# Patient Record
Sex: FEMALE | Race: WHITE | NOT HISPANIC OR LATINO | URBAN - METROPOLITAN AREA
[De-identification: names, ages, dates, MRNs, and addresses within clinical notes are randomized per-mention and may not be internally consistent; named-entity substitution may affect disease eponyms.]

---

## 2017-05-29 ENCOUNTER — EMERGENCY (EMERGENCY)
Facility: HOSPITAL | Age: 52
LOS: 1 days | Discharge: LEFT BEFORE DISCHARGE | End: 2017-05-29
Attending: EMERGENCY MEDICINE
Payer: COMMERCIAL

## 2017-05-29 VITALS
OXYGEN SATURATION: 100 % | HEART RATE: 74 BPM | RESPIRATION RATE: 16 BRPM | DIASTOLIC BLOOD PRESSURE: 87 MMHG | TEMPERATURE: 99 F | SYSTOLIC BLOOD PRESSURE: 152 MMHG

## 2017-05-29 VITALS
SYSTOLIC BLOOD PRESSURE: 159 MMHG | HEART RATE: 86 BPM | RESPIRATION RATE: 16 BRPM | OXYGEN SATURATION: 100 % | TEMPERATURE: 98 F | WEIGHT: 198.42 LBS | DIASTOLIC BLOOD PRESSURE: 80 MMHG

## 2017-05-29 DIAGNOSIS — R20.0 ANESTHESIA OF SKIN: ICD-10-CM

## 2017-05-29 DIAGNOSIS — Z53.29 PROCEDURE AND TREATMENT NOT CARRIED OUT BECAUSE OF PATIENT'S DECISION FOR OTHER REASONS: ICD-10-CM

## 2017-05-29 DIAGNOSIS — I10 ESSENTIAL (PRIMARY) HYPERTENSION: ICD-10-CM

## 2017-05-29 DIAGNOSIS — Z90.710 ACQUIRED ABSENCE OF BOTH CERVIX AND UTERUS: ICD-10-CM

## 2017-05-29 DIAGNOSIS — G43.909 MIGRAINE, UNSPECIFIED, NOT INTRACTABLE, WITHOUT STATUS MIGRAINOSUS: ICD-10-CM

## 2017-05-29 DIAGNOSIS — Z90.710 ACQUIRED ABSENCE OF BOTH CERVIX AND UTERUS: Chronic | ICD-10-CM

## 2017-05-29 LAB
ALBUMIN SERPL ELPH-MCNC: 4 G/DL — SIGNIFICANT CHANGE UP (ref 3.5–5)
ALP SERPL-CCNC: 88 U/L — SIGNIFICANT CHANGE UP (ref 40–120)
ALT FLD-CCNC: 30 U/L DA — SIGNIFICANT CHANGE UP (ref 10–60)
ANION GAP SERPL CALC-SCNC: 3 MMOL/L — LOW (ref 5–17)
AST SERPL-CCNC: 24 U/L — SIGNIFICANT CHANGE UP (ref 10–40)
BASOPHILS # BLD AUTO: 0.1 K/UL — SIGNIFICANT CHANGE UP (ref 0–0.2)
BASOPHILS NFR BLD AUTO: 1.4 % — SIGNIFICANT CHANGE UP (ref 0–2)
BILIRUB SERPL-MCNC: 0.4 MG/DL — SIGNIFICANT CHANGE UP (ref 0.2–1.2)
BUN SERPL-MCNC: 13 MG/DL — SIGNIFICANT CHANGE UP (ref 7–18)
CALCIUM SERPL-MCNC: 8.6 MG/DL — SIGNIFICANT CHANGE UP (ref 8.4–10.5)
CHLORIDE SERPL-SCNC: 105 MMOL/L — SIGNIFICANT CHANGE UP (ref 96–108)
CO2 SERPL-SCNC: 30 MMOL/L — SIGNIFICANT CHANGE UP (ref 22–31)
CREAT SERPL-MCNC: 0.89 MG/DL — SIGNIFICANT CHANGE UP (ref 0.5–1.3)
EOSINOPHIL # BLD AUTO: 0.1 K/UL — SIGNIFICANT CHANGE UP (ref 0–0.5)
EOSINOPHIL NFR BLD AUTO: 1.3 % — SIGNIFICANT CHANGE UP (ref 0–6)
GLUCOSE SERPL-MCNC: 93 MG/DL — SIGNIFICANT CHANGE UP (ref 70–99)
HCT VFR BLD CALC: 39.7 % — SIGNIFICANT CHANGE UP (ref 34.5–45)
HGB BLD-MCNC: 12.6 G/DL — SIGNIFICANT CHANGE UP (ref 11.5–15.5)
LYMPHOCYTES # BLD AUTO: 3.2 K/UL — SIGNIFICANT CHANGE UP (ref 1–3.3)
LYMPHOCYTES # BLD AUTO: 53.4 % — HIGH (ref 13–44)
MAGNESIUM SERPL-MCNC: 1.8 MG/DL — SIGNIFICANT CHANGE UP (ref 1.6–2.6)
MCHC RBC-ENTMCNC: 30.4 PG — SIGNIFICANT CHANGE UP (ref 27–34)
MCHC RBC-ENTMCNC: 31.6 GM/DL — LOW (ref 32–36)
MCV RBC AUTO: 96.3 FL — SIGNIFICANT CHANGE UP (ref 80–100)
MONOCYTES # BLD AUTO: 0.6 K/UL — SIGNIFICANT CHANGE UP (ref 0–0.9)
MONOCYTES NFR BLD AUTO: 9.3 % — SIGNIFICANT CHANGE UP (ref 2–14)
NEUTROPHILS # BLD AUTO: 2.1 K/UL — SIGNIFICANT CHANGE UP (ref 1.8–7.4)
NEUTROPHILS NFR BLD AUTO: 34.6 % — LOW (ref 43–77)
PLATELET # BLD AUTO: 236 K/UL — SIGNIFICANT CHANGE UP (ref 150–400)
POTASSIUM SERPL-MCNC: 4.3 MMOL/L — SIGNIFICANT CHANGE UP (ref 3.5–5.3)
POTASSIUM SERPL-SCNC: 4.3 MMOL/L — SIGNIFICANT CHANGE UP (ref 3.5–5.3)
PROT SERPL-MCNC: 7.8 G/DL — SIGNIFICANT CHANGE UP (ref 6–8.3)
RBC # BLD: 4.13 M/UL — SIGNIFICANT CHANGE UP (ref 3.8–5.2)
RBC # FLD: 13.8 % — SIGNIFICANT CHANGE UP (ref 10.3–14.5)
SODIUM SERPL-SCNC: 138 MMOL/L — SIGNIFICANT CHANGE UP (ref 135–145)
TROPONIN I SERPL-MCNC: <0.015 NG/ML — SIGNIFICANT CHANGE UP (ref 0–0.04)
WBC # BLD: 6 K/UL — SIGNIFICANT CHANGE UP (ref 3.8–10.5)
WBC # FLD AUTO: 6 K/UL — SIGNIFICANT CHANGE UP (ref 3.8–10.5)

## 2017-05-29 PROCEDURE — 70450 CT HEAD/BRAIN W/O DYE: CPT | Mod: 26

## 2017-05-29 PROCEDURE — 83735 ASSAY OF MAGNESIUM: CPT

## 2017-05-29 PROCEDURE — 70450 CT HEAD/BRAIN W/O DYE: CPT

## 2017-05-29 PROCEDURE — 99284 EMERGENCY DEPT VISIT MOD MDM: CPT | Mod: 25

## 2017-05-29 PROCEDURE — 99285 EMERGENCY DEPT VISIT HI MDM: CPT

## 2017-05-29 PROCEDURE — 85027 COMPLETE CBC AUTOMATED: CPT

## 2017-05-29 PROCEDURE — 84484 ASSAY OF TROPONIN QUANT: CPT

## 2017-05-29 PROCEDURE — 80053 COMPREHEN METABOLIC PANEL: CPT

## 2017-05-29 RX ADMIN — Medication 0.5 MILLIGRAM(S): at 22:14

## 2017-05-29 NOTE — ED PROVIDER NOTE - OBJECTIVE STATEMENT
51 y/o F pt with PMHx of HTN, presents to ED c/o numbness to R hand with lightheadedness, nausea and HA x 1 hour PTA (21:00). Pt reports she went to sleep around noon today and woke up 1 hour PTA with the numbness sensation to her R hand. Pt states falling asleep on her bed but denies sleeping in an awkward position that may have caused her hand to feel numb. Pt denies fever, chills, vomiting, diarrhea, neck pain, SOB, or any other complaints. NKDA.

## 2017-05-29 NOTE — ED PROVIDER NOTE - NS ED MD SCRIBE ATTENDING SCRIBE SECTIONS
PAST MEDICAL/SURGICAL/SOCIAL HISTORY/DISPOSITION/HISTORY OF PRESENT ILLNESS/VITAL SIGNS( Pullset)/PHYSICAL EXAM/REVIEW OF SYSTEMS/HIV

## 2017-05-29 NOTE — ED PROVIDER NOTE - MEDICAL DECISION MAKING DETAILS
51 y/o M pt with subjective diminished sensation to the 4th and 5th digits of the R hand. Will do labs, CT head, and reassess. 53 y/o M pt with subjective diminished sensation to the 4th and 5th digits of the R hand. Consideration for stroke, nerve compression vs anxiety. Will do labs, CT head, and reassess.

## 2017-05-29 NOTE — ED PROVIDER NOTE - PROGRESS NOTE DETAILS
D/w pt, still feel numbnes to hand, relates feeling better with nausea "I want to go home can I be discharged?" Discussed CT, labs, testing incomplete for stroke, pt understands limitations of w/u for stroke, wants to go home. Refused EKG to assess for abnormal heart rhythm. Understands r/o worsening/repeat stroke causing disability/death as a result. Pt signed out AMA.

## 2021-12-21 ENCOUNTER — EMERGENCY (EMERGENCY)
Facility: HOSPITAL | Age: 56
LOS: 1 days | Discharge: HOME | End: 2021-12-21
Attending: EMERGENCY MEDICINE
Payer: MEDICAID

## 2021-12-21 VITALS
HEIGHT: 72 IN | SYSTOLIC BLOOD PRESSURE: 127 MMHG | TEMPERATURE: 98 F | RESPIRATION RATE: 17 BRPM | WEIGHT: 175.05 LBS | OXYGEN SATURATION: 98 % | DIASTOLIC BLOOD PRESSURE: 81 MMHG | HEART RATE: 84 BPM

## 2021-12-21 DIAGNOSIS — Z90.710 ACQUIRED ABSENCE OF BOTH CERVIX AND UTERUS: Chronic | ICD-10-CM

## 2021-12-21 PROCEDURE — 99284 EMERGENCY DEPT VISIT MOD MDM: CPT

## 2021-12-21 PROCEDURE — 99283 EMERGENCY DEPT VISIT LOW MDM: CPT | Mod: 25

## 2021-12-21 PROCEDURE — 71101 X-RAY EXAM UNILAT RIBS/CHEST: CPT

## 2021-12-21 PROCEDURE — 71101 X-RAY EXAM UNILAT RIBS/CHEST: CPT | Mod: 26

## 2021-12-21 RX ORDER — OXYCODONE AND ACETAMINOPHEN 5; 325 MG/1; MG/1
1 TABLET ORAL ONCE
Refills: 0 | Status: DISCONTINUED | OUTPATIENT
Start: 2021-12-21 | End: 2021-12-21

## 2021-12-21 RX ADMIN — OXYCODONE AND ACETAMINOPHEN 1 TABLET(S): 5; 325 TABLET ORAL at 18:59

## 2021-12-21 NOTE — ED PROVIDER NOTE - OBJECTIVE STATEMENT
56 y.o female with a PMHx of migraines and hypertension and a surgical history of a hysterectomy is here s/p a fall injuring the left side of her chest yesterday. Patient states she has pain and denies loss of consciousness and trouble breathing.

## 2021-12-21 NOTE — ED ADULT NURSE NOTE - NSIMPLEMENTINTERV_GEN_ALL_ED
Implemented All Universal Safety Interventions:  [place] to call system. Call bell, personal items and telephone within reach. Instruct patient to call for assistance. Room bathroom lighting operational. Non-slip footwear when patient is off stretcher. Physically safe environment: no spills, clutter or unnecessary equipment. Stretcher in lowest position, wheels locked, appropriate side rails in place.

## 2021-12-21 NOTE — ED PROVIDER NOTE - NSFOLLOWUPINSTRUCTIONS_ED_ALL_ED_FT
[facility]                                                                      Rib Contusion      A rib contusion is a deep bruise on the rib area. Contusions are the result of a blunt trauma that causes bleeding and injury to the tissues under the skin. A rib contusion may involve bruising of the ribs and of the skin and muscles in the area. The skin over the contusion may turn blue, purple, or yellow. Minor injuries result in a painless contusion. More severe contusions may be painful and swollen for a few weeks.      What are the causes?    This condition is usually caused by a hard, direct hit to an area of the body. This often occurs while playing contact sports.      What are the signs or symptoms?  Symptoms of this condition include:  •Swelling and redness of the injured area.      •Discoloration of the injured area.      •Tenderness and soreness of the injured area.      •Pain with or without movement.      •Pain when breathing in.        How is this diagnosed?  This condition may be diagnosed based on:  •Your symptoms and medical history.      •A physical exam.      •Imaging tests—such as an X-ray, CT scan, or MRI—to determine if there were internal injuries or broken bones (fractures).        How is this treated?  This condition may be treated with:  •Rest. This is often the best treatment for a rib contusion.      •Ice packs. This reduces swelling and inflammation.      •Deep-breathing exercises. These may be recommended to reduce the risk for lung collapse and pneumonia.      •Medicines. Over-the-counter or prescription medicines may be given to control pain.      •Injection of a numbing medicine around the nerve near your injury (nerve block).        Follow these instructions at home:    Medicines     •Take over-the-counter and prescription medicines only as told by your health care provider.    •Ask your health care provider if the medicine prescribed to you:  •Requires you to avoid driving or using machinery.    •Can cause constipation. You may need to take these actions to prevent or treat constipation:  •Drink enough fluid to keep your urine pale yellow.       •Take over-the-counter or prescription medicines.      •Eat foods that are high in fiber, such as beans, whole grains, and fresh fruits and vegetables.      •Limit foods that are high in fat and processed sugars, such as fried or sweet foods.             Managing pain, stiffness, and swelling   If directed, put ice on the injured area. To do this:  •Put ice in a plastic bag.      •Place a towel between your skin and the bag.      •Leave the ice on for 20 minutes, 2–3 times a day.      •Remove the ice if your skin turns bright red. This is very important. If you cannot feel pain, heat, or cold, you have a greater risk of damage to the area.      Activity     •Rest the injured area.      •Avoid strenuous activity and any activities or movements that cause pain. Be careful during activities, and avoid bumping the injured area.      • Do not lift anything that is heavier than 5 lb (2.3 kg), or the limit that you are told, until your health care provider says that it is safe.        General instructions      • Do not use any products that contain nicotine or tobacco, such as cigarettes, e-cigarettes, and chewing tobacco. These can delay healing. If you need help quitting, ask your health care provider.      •Do deep-breathing exercises as told by your health care provider.      •If you were given an incentive spirometer, use it every 1–2 hours while you are awake, or as recommended by your health care provider. This device measures how well you are filling your lungs with each breath.      •Keep all follow-up visits. This is important.        Contact a health care provider if you have:    •Increased bruising or swelling.      •Pain that is not controlled with treatment.      •A fever.        Get help right away if you:    •Have difficulty breathing or shortness of breath.      •Develop a continual cough, or you cough up thick or bloody mucus from your lungs (sputum).      •Feel nauseous or you vomit.      •Have pain in your abdomen.      These symptoms may represent a serious problem that is an emergency. Do not wait to see if the symptoms will go away. Get medical help right away. Call your local emergency services (911 in the [place] Do not drive yourself to the hospital.       Summary    •A rib contusion is a deep bruise on your rib area. Contusions are the result of a blunt trauma that causes bleeding and injury to the tissues under the skin.      •The skin over the contusion may turn blue, purple, or yellow. Minor injuries may cause a painless contusion. More severe contusions may be painful and swollen for a few weeks.      •Rest the injured area. Avoid strenuous activity and any activities or movements that cause pain.      This information is not intended to replace advice given to you by your health care provider. Make sure you discuss any questions you have with your health care provider.      Document Revised: 03/24/2021 Document Reviewed: 03/24/2021    Elsevier Patient Education © 2021 [employer]

## 2021-12-21 NOTE — ED PROVIDER NOTE - PATIENT PORTAL LINK FT
You can access the FollowMyHealth Patient Portal offered by [facility] by registering at the following website: [email or web address]. By joining [employer]’s FollowMyHealth portal, you will also be able to view your health information using other applications (apps) compatible with our system.

## 2021-12-21 NOTE — ED ADULT NURSE NOTE - NS ED NURSE DISCH DISPOSITION
Nephrology Daily Progress Note    Date of Service  9/7/2020    Chief Complaint  41 y.o. 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    Interval Problem Update  9/2 -patient remains in ICU on low-dose norepinephrine.  Patient complains of some hemoptysis.  Patient denies chest pain, shortness of breath.  9/3-patient had dialysis yesterday with 2 L removed.  No issues with dialysis yesterday.  Patient weaned off pressors overnight.  Patient seen this afternoon, denies chest pain, shortness of breath.  9/5 -patient had dialysis yesterday with 2 L removed.  Oxygenation improving today.  Patient denies chest pain, shortness of breath.  9/6 -patient remains on supplemental oxygen, with 4 L/min.  Patient denies chest pain, shortness of breath.  Hoping to go home soon.  9/7 pt is doing better, anxious to go home, had HD earlier today  Review of Systems  Review of Systems   Constitutional: Negative for chills, fever and malaise/fatigue.   Respiratory: Negative for cough and shortness of breath.    Cardiovascular: Negative for chest pain and leg swelling.   Gastrointestinal: Negative for nausea and vomiting.   Genitourinary: Negative for dysuria, frequency and urgency.        Physical Exam  Temp:  [36.3 °C (97.3 °F)-36.4 °C (97.5 °F)] 36.4 °C (97.5 °F)  Pulse:  [85-93] 85  Resp:  [18] 18  BP: (103-118)/(55-73) 103/55  SpO2:  [97 %-100 %] 97 %    Physical Exam  Vitals signs and nursing note reviewed.   Constitutional:       General: He is not in acute distress.     Appearance: He is not ill-appearing.   HENT:      Head: Normocephalic and atraumatic.      Right Ear: External ear normal.      Left Ear: External ear normal.      Nose: Nose normal.   Eyes:      General:         Right eye: No discharge.         Left eye: No discharge.      Conjunctiva/sclera: Conjunctivae normal.   Cardiovascular:      Rate and  Rhythm: Normal rate and regular rhythm.      Heart sounds: No murmur.   Pulmonary:      Effort: Pulmonary effort is normal. No respiratory distress.      Breath sounds: Normal breath sounds. No wheezing.   Musculoskeletal:         General: No tenderness or deformity.      Right lower leg: Edema present.      Left lower leg: Edema present.   Skin:     General: Skin is warm.   Neurological:      General: No focal deficit present.      Mental Status: He is alert and oriented to person, place, and time.   Psychiatric:         Mood and Affect: Mood normal.         Behavior: Behavior normal.     Access: Left thigh AV graft, with patent bruit    Fluids    Intake/Output Summary (Last 24 hours) at 9/7/2020 1159  Last data filed at 9/7/2020 0900  Gross per 24 hour   Intake 180 ml   Output 2500 ml   Net -2320 ml       Laboratory  Labs reviewed, pertinent labs below.  Recent Labs     09/05/20  0143 09/05/20  1235 09/06/20  0541 09/07/20  0521   WBC 6.9  --  5.4 7.2   RBC 2.63*  --  2.64* 2.54*   HEMOGLOBIN 8.1*  8.1* 8.4* 8.0* 7.8*   HEMATOCRIT 25.0*  25.0* 26.5* 25.5* 23.7*   MCV 97.3  --  96.6 93.3   MCH 30.4  --  30.3 30.7   MCHC 31.3*  --  31.4* 32.9*   RDW 60.2*  --  59.1* 56.0*   PLATELETCT 222  --  219 222   MPV 9.8  --  9.5 9.0     Recent Labs     09/05/20  0143 09/06/20  0541 09/07/20  0521   SODIUM 137 138 134*   POTASSIUM 4.7 4.4 4.4   CHLORIDE 91* 93* 89*   CO2 26 24 24   GLUCOSE 119* 125* 109*   BUN 38* 61* 78*   CREATININE 5.63* 7.80* 8.95*   CALCIUM 8.0* 7.7* 7.6*     Recent Labs     09/05/20  0143 09/06/20  0111 09/07/20  0521   INR 4.04* 3.90* 5.84*           URINALYSIS:  No results found for: COLORURINE, CLARITY, SPECGRAVITY, PHURINE, KETONES, PROTEINURIN, BILIRUBINUR, UROBILU, NITRITE, LEUKESTERAS, OCCULTBLOOD  UPC  No results found for: TOTPROTUR No results found for: CREATININEU      Imaging reviewed  DX-CHEST-PORTABLE (1 VIEW)   Final Result      Bilateral interstitial and airspace opacities are most  suggestive of atypical infection. Edema is considered less likely.            Current Facility-Administered Medications   Medication Dose Route Frequency Provider Last Rate Last Dose   • hydrALAZINE (APRESOLINE) injection 10 mg  10 mg Intravenous Q6HRS PRN Jonn Gallo M.D.       • amLODIPine (NORVASC) tablet 5 mg  5 mg Oral Q DAY Jonn Gallo M.D.   Stopped at 09/07/20 0600   • MD Alert...Warfarin per Pharmacy   Other PHARMACY TO DOSE Jonn Gallo M.D.       • albuterol inhaler 2 Puff  2 Puff Inhalation Q4HRS Jonn Gallo M.D.   2 Puff at 09/07/20 0849   • budesonide-formoterol (SYMBICORT) 80-4.5 MCG/ACT inhaler 2 Puff  2 Puff Inhalation BID Jonn Gallo M.D.   2 Puff at 09/07/20 0612   • ascorbic acid tablet 2,000 mg  2,000 mg Oral DAILY Jonn Gallo M.D.   2,000 mg at 09/07/20 0611   • vitamin D (cholecalciferol) tablet 1,000 Units  1,000 Units Oral DAILY Jonn Gallo M.D.   1,000 Units at 09/07/20 0611   • zinc sulfate (ZINCATE) capsule 220 mg  220 mg Oral DAILY Jonn Gallo M.D.   220 mg at 09/07/20 0611   • guaiFENesin ER (MUCINEX) tablet 600 mg  600 mg Oral Q12HRS Jonn Gallo M.D.   600 mg at 09/07/20 0611   • ALPRAZolam (XANAX) tablet 0.25 mg  0.25 mg Oral HS PRN Polly William M.D.   0.25 mg at 09/07/20 0011   • HYDROcodone-acetaminophen (NORCO) 5-325 MG per tablet 1-2 Tab  1-2 Tab Oral Q6HRS PRN Bolivar Paz M.D.   2 Tab at 09/07/20 0611   • NS (BOLUS) infusion 250 mL  250 mL Intravenous DIALYSIS PRN Raul Londono M.D.       • albumin human 25% solution 12.5 g  12.5 g Intravenous DIALYSIS PRN Raul Londono M.D.       • lidocaine (XYLOCAINE) 1 % injection 1 mL  1 mL Intradermal PRN Raul Londono M.D.   1 mL at 09/04/20 1245   • epoetin (Retacrit) injection (Dialysis Use Only) 4,000 Units  4,000 Units Intravenous MO, WE + FR Raul Londono M.D.   4,000 Units at 09/07/20 0700   • benzocaine-menthol (CEPACOL) lozenge 1 Lozenge  1 Lozenge Mouth/Throat Q2HRS PRN Bolivar Paz M.D.   1  Lozenge at 09/03/20 0029   • benzonatate (TESSALON) capsule 100 mg  100 mg Oral TID PRN Polly William M.D.   100 mg at 09/06/20 2121   • guaiFENesin (ROBITUSSIN) 100 MG/5ML solution 200 mg  10 mL Oral Q4HRS PRN Polly William M.D.   200 mg at 09/06/20 2119   • HYDROcodone/acetaminophen (NORCO)  MG per tablet 3 Tab  3 Tab Oral QHS Bolivar Paz M.D.   3 Tab at 09/06/20 2120   • sevelamer carbonate (RENVELA) tablet 3,200 mg  3,200 mg Oral TID WITH MEALS Bolivar Paz M.D.   3,200 mg at 09/06/20 1823   • tizanidine (ZANAFLEX) tablet 8 mg  8 mg Oral QHS Bolivar Paz M.D.   8 mg at 09/06/20 2121   • calcitRIOL (ROCALTROL) capsule 0.75 mcg  0.75 mcg Oral QHS Bolivar Paz M.D.   0.75 mcg at 09/06/20 2121   • cephALEXin (KEFLEX) capsule 500 mg  500 mg Oral MO, WE + FR Bolivar Paz M.D.   500 mg at 09/07/20 0849   • gabapentin (NEURONTIN) capsule 2,400 mg  2,400 mg Oral Nightly Bolivar Paz M.D.   2,400 mg at 09/06/20 2121    And   • gabapentin (NEURONTIN) capsule 600 mg  600 mg Oral QHS Bolivar Paz M.D.   600 mg at 09/06/20 2120   • dexamethasone (DECADRON) tablet 6 mg  6 mg Oral DAILY Bolivar Paz M.D.   6 mg at 09/07/20 0611         Assessment/Plan  41 y.o. 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     1. ESRD on HD   2. Access: left thigh AVG  3. COVID-19 pneumonia.:better  4.  Hypertension: controlled.  .  5. Anemia of chronic disease.  HD TTS as outpt  Renal diet  Daily labs  Renal dose all meds  Avoid nephrotoxins  Prognosis guarded.  D/W Dr Gallo         Discharged

## 2022-06-01 ENCOUNTER — EMERGENCY (EMERGENCY)
Facility: HOSPITAL | Age: 57
LOS: 1 days | Discharge: LEFT BEFORE DISCHARGE | End: 2022-06-01
Attending: STUDENT IN AN ORGANIZED HEALTH CARE EDUCATION/TRAINING PROGRAM
Payer: MEDICAID

## 2022-06-01 VITALS
TEMPERATURE: 98 F | HEIGHT: 70.08 IN | WEIGHT: 198.42 LBS | OXYGEN SATURATION: 100 % | HEART RATE: 80 BPM | RESPIRATION RATE: 18 BRPM | DIASTOLIC BLOOD PRESSURE: 79 MMHG | SYSTOLIC BLOOD PRESSURE: 122 MMHG

## 2022-06-01 DIAGNOSIS — Z90.710 ACQUIRED ABSENCE OF BOTH CERVIX AND UTERUS: Chronic | ICD-10-CM

## 2022-06-01 PROCEDURE — 99283 EMERGENCY DEPT VISIT LOW MDM: CPT

## 2022-06-01 PROCEDURE — L9991: CPT

## 2022-06-01 PROCEDURE — 93005 ELECTROCARDIOGRAM TRACING: CPT

## 2022-06-01 RX ORDER — ONDANSETRON 8 MG/1
4 TABLET, FILM COATED ORAL ONCE
Refills: 0 | Status: DISCONTINUED | OUTPATIENT
Start: 2022-06-01 | End: 2022-06-05

## 2022-06-01 RX ORDER — ACETAMINOPHEN 500 MG
650 TABLET ORAL ONCE
Refills: 0 | Status: DISCONTINUED | OUTPATIENT
Start: 2022-06-01 | End: 2022-06-05

## 2022-06-01 RX ORDER — SODIUM CHLORIDE 9 MG/ML
1000 INJECTION INTRAMUSCULAR; INTRAVENOUS; SUBCUTANEOUS ONCE
Refills: 0 | Status: DISCONTINUED | OUTPATIENT
Start: 2022-06-01 | End: 2022-06-05

## 2022-06-02 NOTE — ED ADULT NURSE NOTE - CHIEF COMPLAINT QUOTE
pt complaints of vomiting x 2 days. Also c/o belly pain and pain under the left breast.  States " I have a breast implant since 2013". Pt also states that she got Shingles vaccine 2-3 weeks ago.

## 2022-06-02 NOTE — ED ADULT NURSE NOTE - NSIMPLEMENTINTERV_GEN_ALL_ED
Implemented All Fall with Harm Risk Interventions:  [place] to call system. Call bell, personal items and telephone within reach. Instruct patient to call for assistance. Room bathroom lighting operational. Non-slip footwear when patient is off stretcher. Physically safe environment: no spills, clutter or unnecessary equipment. Stretcher in lowest position, wheels locked, appropriate side rails in place. Provide visual cue, wrist band, yellow gown, etc. Monitor gait and stability. Monitor for mental status changes and reorient to person, place, and time. Review medications for side effects contributing to fall risk. Reinforce activity limits and safety measures with patient and family. Provide visual clues: red socks.

## 2022-10-27 NOTE — ED ADULT NURSE NOTE - NSFALLRSKASSESASSIST_ED_ALL_ED
Multiple attempts to reach pt unsuccessful.  Will send letter.   RNCM very familiar with pt.  Needs: a1c, dm/ckd microalbumin.  Pt needs new pcp, will assist pt in finding one.  Luis A   no

## 2023-03-05 ENCOUNTER — EMERGENCY (EMERGENCY)
Facility: HOSPITAL | Age: 58
LOS: 1 days | Discharge: HOME | End: 2023-03-05
Attending: STUDENT IN AN ORGANIZED HEALTH CARE EDUCATION/TRAINING PROGRAM
Payer: MEDICAID

## 2023-03-05 VITALS
WEIGHT: 190.04 LBS | DIASTOLIC BLOOD PRESSURE: 81 MMHG | OXYGEN SATURATION: 98 % | RESPIRATION RATE: 18 BRPM | HEART RATE: 78 BPM | SYSTOLIC BLOOD PRESSURE: 136 MMHG | TEMPERATURE: 98 F

## 2023-03-05 VITALS
OXYGEN SATURATION: 97 % | SYSTOLIC BLOOD PRESSURE: 134 MMHG | DIASTOLIC BLOOD PRESSURE: 80 MMHG | RESPIRATION RATE: 18 BRPM | HEART RATE: 82 BPM

## 2023-03-05 DIAGNOSIS — Z90.710 ACQUIRED ABSENCE OF BOTH CERVIX AND UTERUS: Chronic | ICD-10-CM

## 2023-03-05 LAB
ALBUMIN SERPL ELPH-MCNC: 3.8 G/DL — SIGNIFICANT CHANGE UP (ref 3.5–5)
ALP SERPL-CCNC: 102 U/L — SIGNIFICANT CHANGE UP (ref 40–120)
ALT FLD-CCNC: 41 U/L DA — SIGNIFICANT CHANGE UP (ref 10–60)
ANION GAP SERPL CALC-SCNC: 4 MMOL/L — LOW (ref 5–17)
AST SERPL-CCNC: 27 U/L — SIGNIFICANT CHANGE UP (ref 10–40)
BASOPHILS # BLD AUTO: 0.06 K/UL — SIGNIFICANT CHANGE UP (ref 0–0.2)
BASOPHILS NFR BLD AUTO: 0.9 % — SIGNIFICANT CHANGE UP (ref 0–2)
BILIRUB SERPL-MCNC: 0.5 MG/DL — SIGNIFICANT CHANGE UP (ref 0.2–1.2)
BUN SERPL-MCNC: 18 MG/DL — SIGNIFICANT CHANGE UP (ref 7–18)
CALCIUM SERPL-MCNC: 9 MG/DL — SIGNIFICANT CHANGE UP (ref 8.4–10.5)
CHLORIDE SERPL-SCNC: 109 MMOL/L — HIGH (ref 96–108)
CO2 SERPL-SCNC: 26 MMOL/L — SIGNIFICANT CHANGE UP (ref 22–31)
CREAT SERPL-MCNC: 0.98 MG/DL — SIGNIFICANT CHANGE UP (ref 0.5–1.3)
D DIMER BLD IA.RAPID-MCNC: 192 NG/ML DDU — SIGNIFICANT CHANGE UP
EGFR: 68 ML/MIN/1.73M2 — SIGNIFICANT CHANGE UP
EOSINOPHIL # BLD AUTO: 0.04 K/UL — SIGNIFICANT CHANGE UP (ref 0–0.5)
EOSINOPHIL NFR BLD AUTO: 0.6 % — SIGNIFICANT CHANGE UP (ref 0–6)
FLUAV AG NPH QL: SIGNIFICANT CHANGE UP
FLUBV AG NPH QL: SIGNIFICANT CHANGE UP
GLUCOSE SERPL-MCNC: 97 MG/DL — SIGNIFICANT CHANGE UP (ref 70–99)
HCT VFR BLD CALC: 36 % — SIGNIFICANT CHANGE UP (ref 34.5–45)
HGB BLD-MCNC: 11.6 G/DL — SIGNIFICANT CHANGE UP (ref 11.5–15.5)
IMM GRANULOCYTES NFR BLD AUTO: 0.1 % — SIGNIFICANT CHANGE UP (ref 0–0.9)
LACTATE SERPL-SCNC: 1.7 MMOL/L — SIGNIFICANT CHANGE UP (ref 0.7–2)
LYMPHOCYTES # BLD AUTO: 3 K/UL — SIGNIFICANT CHANGE UP (ref 1–3.3)
LYMPHOCYTES # BLD AUTO: 42.7 % — SIGNIFICANT CHANGE UP (ref 13–44)
MCHC RBC-ENTMCNC: 29.5 PG — SIGNIFICANT CHANGE UP (ref 27–34)
MCHC RBC-ENTMCNC: 32.2 GM/DL — SIGNIFICANT CHANGE UP (ref 32–36)
MCV RBC AUTO: 91.6 FL — SIGNIFICANT CHANGE UP (ref 80–100)
MONOCYTES # BLD AUTO: 0.69 K/UL — SIGNIFICANT CHANGE UP (ref 0–0.9)
MONOCYTES NFR BLD AUTO: 9.8 % — SIGNIFICANT CHANGE UP (ref 2–14)
NEUTROPHILS # BLD AUTO: 3.23 K/UL — SIGNIFICANT CHANGE UP (ref 1.8–7.4)
NEUTROPHILS NFR BLD AUTO: 45.9 % — SIGNIFICANT CHANGE UP (ref 43–77)
NRBC # BLD: 0 /100 WBCS — SIGNIFICANT CHANGE UP (ref 0–0)
NT-PROBNP SERPL-SCNC: 70 PG/ML — SIGNIFICANT CHANGE UP (ref 0–125)
PLATELET # BLD AUTO: 257 K/UL — SIGNIFICANT CHANGE UP (ref 150–400)
POTASSIUM SERPL-MCNC: 3.6 MMOL/L — SIGNIFICANT CHANGE UP (ref 3.5–5.3)
POTASSIUM SERPL-SCNC: 3.6 MMOL/L — SIGNIFICANT CHANGE UP (ref 3.5–5.3)
PROT SERPL-MCNC: 6.9 G/DL — SIGNIFICANT CHANGE UP (ref 6–8.3)
RBC # BLD: 3.93 M/UL — SIGNIFICANT CHANGE UP (ref 3.8–5.2)
RBC # FLD: 14.4 % — SIGNIFICANT CHANGE UP (ref 10.3–14.5)
SARS-COV-2 RNA SPEC QL NAA+PROBE: DETECTED
SODIUM SERPL-SCNC: 139 MMOL/L — SIGNIFICANT CHANGE UP (ref 135–145)
TROPONIN I, HIGH SENSITIVITY RESULT: 4.7 NG/L — SIGNIFICANT CHANGE UP
WBC # BLD: 7.03 K/UL — SIGNIFICANT CHANGE UP (ref 3.8–10.5)
WBC # FLD AUTO: 7.03 K/UL — SIGNIFICANT CHANGE UP (ref 3.8–10.5)

## 2023-03-05 PROCEDURE — 80053 COMPREHEN METABOLIC PANEL: CPT

## 2023-03-05 PROCEDURE — 99283 EMERGENCY DEPT VISIT LOW MDM: CPT

## 2023-03-05 PROCEDURE — 99285 EMERGENCY DEPT VISIT HI MDM: CPT

## 2023-03-05 PROCEDURE — 84484 ASSAY OF TROPONIN QUANT: CPT

## 2023-03-05 PROCEDURE — 87637 SARSCOV2&INF A&B&RSV AMP PRB: CPT

## 2023-03-05 PROCEDURE — 83880 ASSAY OF NATRIURETIC PEPTIDE: CPT

## 2023-03-05 PROCEDURE — 85025 COMPLETE CBC W/AUTO DIFF WBC: CPT

## 2023-03-05 PROCEDURE — 83605 ASSAY OF LACTIC ACID: CPT

## 2023-03-05 PROCEDURE — 71045 X-RAY EXAM CHEST 1 VIEW: CPT | Mod: 26

## 2023-03-05 PROCEDURE — 85379 FIBRIN DEGRADATION QUANT: CPT

## 2023-03-05 PROCEDURE — 71045 X-RAY EXAM CHEST 1 VIEW: CPT

## 2023-03-05 PROCEDURE — 36415 COLL VENOUS BLD VENIPUNCTURE: CPT

## 2023-03-05 RX ORDER — NIRMATRELVIR AND RITONAVIR 150-100 MG
1 KIT ORAL
Qty: 10 | Refills: 0
Start: 2023-03-05 | End: 2023-03-09

## 2023-03-05 NOTE — ED PROVIDER NOTE - OBJECTIVE STATEMENT
57-year-old female hx of HTN presenting with bodyaches and dizziness for the past day. No chest pain or SOB. No other symptoms. Tested positive on a home COVID test earlier today, so she came to ER to get checked out.

## 2023-03-05 NOTE — ED PROVIDER NOTE - PATIENT PORTAL LINK FT
You can access the FollowMyHealth Patient Portal offered by [facility] by registering at the following website: [email or web address]. By joining [employer]’s FollowMyHealth portal, you will also be able to view your health information using other applications (apps) compatible with our system.

## 2023-03-05 NOTE — ED ADULT NURSE NOTE - OBJECTIVE STATEMENT
Pt states did not feel well today , did a home test and was positive for Covid   c/o dizziness, body aches , fever and chills

## 2023-03-05 NOTE — ED PROVIDER NOTE - DISPOSITION TYPE
Refill requested. Chart reviewed. Follow up and lab appointments on file for end of June 2019. Refilled as requested.     E-scribe failed twice on Levothyroxine. Left message for pharmacy.     levothyroxine (SYNTHROID, LEVOTHROID) 150 MCG tablet 90 tablet 0 6/4/2019     Sig - Route: Take 1 tablet by mouth daily. - Oral    Sent to pharmacy as: Levothyroxine Sodium 150 MCG Oral Tablet    Class: Eprescribe    E-Prescribing Status: Transmission to pharmacy failed (6/4/2019  1:57 PM CDT)           DISCHARGE

## 2023-03-05 NOTE — ED PROVIDER NOTE - CLINICAL SUMMARY MEDICAL DECISION MAKING FREE TEXT BOX
57-year-old female hx of HTN presenting with bodyaches and dizziness for the past day. Tested positive for COVID. Labs and CXR normal. Symptoms likely due to COVID. Patient is requesting Paxlovid. Supportive care recs and return precautions provided.

## 2023-03-05 NOTE — ED PROVIDER NOTE - NSFOLLOWUPINSTRUCTIONS_ED_ALL_ED_FT
You were seen in the emergency department for: bodyaches  Your diagnosis for this visit was: COVID-19  Please make sure to stay hydrated and take Tylenol/Ibuprofen for fevers/pain.  If you develop chest pain or shortness of breath, please return to the ER.   Your results report is attached.   As per your request, a prescription for Paxlovid was sent to your pharmacy.

## 2023-07-12 NOTE — ED ADULT TRIAGE NOTE - WEIGHT IN LBS
Emergency Department Attending Note    Patient: Darby Richter Age: 48 year old Sex: female   MRN: [id number] : 1975 Encounter Date: 2023       HISTORY OF PRESENT ILLNESS  This is a 48 year old female with no known past medical history now presenting for further evaluation of lower abdominal pain and diarrhea.  Patient recently underwent cervical polypectomy approximately 5 days ago.    Patient states the procedure went well and patient was recovering however yesterday had onset of several episodes of loose watery diarrhea associated with some lower abdominal crampy pains.  Also with some bilateral low back pains.    Patient denies vaginal discharge or bleeding.    She did not have any fevers or chills.  No nausea or vomiting.  Still tolerating p.o. intake at her baseline.    No dysuria hematuria or frequency.    Denies chest pain or shortness of breath.    No numbness tingling or weakness.           REVIEW OF SYSTEMS:   Review of Systems   Constitutional:   No fever, no chills, no weakness   Skin:       No rash   ENT:        No sore throat, no congestion   Respiratory:    No cough, no shortness of breath    Cardiovascular: No chest pain, no edema   Gastrointestinal: + Abdominal pain, no nausea, no vomiting   Genitourinary:  No dysuria, no hematuria   Musculoskeletal: + Back pain, no joint pain   Neurologic:    No headache, no dizziness      Heme:      No bleeding, clotting        [] Unable to obtain due to: [] Clinical Condition [] Baseline Cognitive Ability / Medical Condition           PAST HISTORY         Past Medical History:   Diagnosis Date   • Bipolar 1 disorder, depressed (CMD)    • Depression    • Obesity (BMI 30-39.9)    • Osteoarthritis    • Stickler syndrome    • Stickler syndrome     Past Surgical History:   Procedure Laterality Date   • REPAIR OF COMPLEX RETINAL DETACH W/VITRECTOMY/MEMBRANE PEELING          Additional PMH (also as noted in hpi & pmh section):  [] Denies PMH  [] As  Above Additional PSH (also as noted in hpi & PSH section) :  [] Denies PSH  [] As above          Social History     Tobacco Use   • Smoking status: Every Day     Current packs/day: 0.00     Types: Cigarettes     Last attempt to quit: 06/2013     Years since quitting: 10.1   • Smokeless tobacco: Never   Vaping Use   • Vaping Use: never used   Substance Use Topics   • Alcohol use: No   • Drug use: Not Currently     Types: Other     Comment: Methadone     Family History   Problem Relation Age of Onset   • Diabetes Mother    • Cancer Mother    • Hyperlipidemia Mother    • Hypertension Mother    • Diabetes Father    • Hyperlipidemia Father    • Other Father         Stickler dz   • Leukemia Maternal Grandfather    • Cancer Paternal Grandmother        Additional SH:  [] Denies etoh  [] Denies tob  [] Denies illicit substances  [] Lives with family  [] Lives in nursing home / care facility  [] Lives in assisted living / group home Additional FH:          Prior to Admission medications    Medication Sig Start Date End Date Taking? Authorizing Provider   timolol (TIMOPTIC) 0.5 % ophthalmic solution Place 1 drop into right eye. 2/15/23   Provider, Outside   latanoprost (XALATAN) 0.005 % ophthalmic solution Place 1 drop into right eye. 6/21/23 6/20/24  Provider, Outside   Nexplanon 68 MG implant  5/18/23   Provider, Outside   etonogestrel (NEXPLANON) 68 MG implant Inject 1 each into the skin. 5/18/23   Provider, Outside   doxycycline hyclate (VIBRAMYCIN) 100 MG capsule TAKE 1 CAPSULE BY MOUTH TWICE DAILY FOR 7 DAYS WITH AT LEAST 8 OUNCES OF WATER - DO NOT LIE DOWN FOR 30 MINUTES AFTER 5/23/23   Provider, Outside   brimonidine (ALPHAGAN) 0.2 % ophthalmic solution Place 1 drop into right eye.    Provider, Outside   acetaZOLAMIDE (DIAMOX) 125 MG tablet Take 125 mg by mouth. 6/21/23   Provider, Outside   timolol (TIMOPTIC) 0.5 % ophthalmic solution INSTILL 1 DROP IN RIGHT EYE TWICE DAILY 2/15/23   Provider, Outside   timolol  (TIMOPTIC) 0.25 % ophthalmic solution every 12 hours.    Provider, Outside   prednisoLONE acetate (PRED FORTE) 1 % ophthalmic suspension every 6 hours.    Provider, Outside   latanoprost (XALATAN) 0.005 % ophthalmic solution every 24 hours.    Provider, Outside   latanoprost (XALATAN) 0.005 % ophthalmic solution Apply 1 drop to eye. 2/15/23 2/15/24  Provider, Outside   dorzolamide (TRUSOPT) 2 % ophthalmic solution every 8 hours.    Provider, Outside   brimonidine (ALPHAGAN) 0.2 % ophthalmic solution every 12 hours.    Provider, Outside   amoxicillin (AMOXIL) 500 MG tablet TAKE 1 TABLET BY MOUTH THREE TIMES DAILY FOR 7 DAYS 3/22/23   Provider, Outside   acetaZOLAMIDE (DIAMOX) 125 MG tablet Take 125 mg by mouth. 2/15/23   Provider, Outside   lamoTRIgine (LaMICtal) 100 MG tablet lamoTRIgine    Provider, Outside   lamoTRIgine (LaMICtal) 25 MG tablet Take 25 mg by mouth every morning. 1/17/23   Provider, Outside   acetaZOLAMIDE (DIAMOX) 125 MG tablet Take 125 mg by mouth.    Provider, Outside   nitrofurantoin, macrocrystal-monohydrate, (MACROBID) 100 MG capsule Take 1 capsule by mouth in the morning and 1 capsule in the evening. 2/7/23   Shalom Paris PA-C   VITAMIN D, CHOLECALCIFEROL, PO Take 1 capsule by mouth daily.    Provider, Outside   ibuprofen (MOTRIN) 800 MG tablet Take 800 mg by mouth every 8 hours as needed. 10/24/22   Provider, Outside   dorzolamide (TRUSOPT) 2 % ophthalmic solution Place 1 drop into right eye 2 (two) times a day. 10/5/22   Provider, Outside   prednisoLONE acetate (PRED FORTE) 1 % ophthalmic suspension Place 1 drop into left eye 4 times daily.    Provider, Outside   brimonidine (ALPHAGAN) 0.2 % ophthalmic solution Place 1 drop into right eye 2 (two) times a day. 7/16/22   Provider, Outside   latanoprost (XALATAN) 0.005 % ophthalmic solution INSTILL 1 DROP IN RIGHT EYE EVERY NIGHT 6/18/22   Provider, Outside   timolol (TIMOPTIC) 0.25 % ophthalmic solution Place 1 drop into right eye  in the morning and 1 drop in the evening. 7/13/22   Provider, Outside   Allergy Relief 180 MG tablet TAKE 1 TABLET BY MOUTH DAILY 5/2/22   Shalom Paris PA-C   methaDONE (DOLOPHINE) 10 MG tablet Take 10 mg by mouth daily.    Provider, Outside   lamoTRIgine (LaMICtal) 100 MG tablet Take 100 mg by mouth at bedtime.    Provider, Outside     No Known Allergies        PHYSICAL EXAMINATION  ED Triage Vitals [07/11/23 0843]   /88   Heart Rate 75   Resp 16   Temp 97.3 °F (36.3 °C)   SpO2 99 %       Physical Exam    General:  Patient is generally well-appearing, well hydrated, in no acute distress  Head:  Atraumatic  HEENT:  Moist mucous membranes, oropharynx non-erythematous  Neck:  Supple, no jvd  Cardiovasc: Regular rate and rhythm, no murmurs rubs or gallops, equal peripheral pulses  Resp:  No increased work of breathing, lungs are clear to auscultation bilaterally  GI:   Abdomen is soft, nondistended, + BS, mild suprapubic and left sided abdominal pain without rebound or guarding  Back:  Normal appearance and palpation, no cva tenderness, minimal bilateral low lumbar paraspinal muscular ttp w/o erythema, swelling, ecchymosis, stepoff, warmth, nor midline/point bony ttp  Musculoskeletal: No cyanosis, clubbing, edema. Good peripheral perfusion  Skin:   No obvious lesions  Neuro:  No focal neurologic abnormalities  Psych:  Cooperative, appropriate mood and affect               MDM / IMPRESSION / PLAN  This is a 48 year old female with signs and symptoms suggestive of suspected gastroenteritis versus other viral enteritis.    Clinically have low suspicion for surgical complication at this time.    clinically not suggestive of severe intraabdominal process such as major infection, hemorrhage, obstruction, perforation, ischemia, dissection. also not suggestive of severe gu process such as major infection, torsion, toa, pid    Patient had labs ordered via triage which were unremarkable including a normal white  198.4 blood cell count and hemoglobin.  Urinalysis did not show clear evidence of infection.  Patient had abdominal CT scan also ordered via triage which did not have acute findings.    Gynecologic she team was consulted and evaluated patient in the emergency department.  No further interventions from their standpoint.    We will proceed with plan for discharge to home, continue p.o. hydration, Tylenol as needed, close monitoring and PCP follow-up.  Return precautions.             Labs:   Results for orders placed or performed during the hospital encounter of 07/12/23   Comprehensive Metabolic Panel   Result Value Ref Range    Fasting Status      Sodium 138 135 - 145 mmol/L    Potassium 3.7 3.4 - 5.1 mmol/L    Chloride 110 97 - 110 mmol/L    Carbon Dioxide 23 21 - 32 mmol/L    Anion Gap 9 7 - 19 mmol/L    Glucose 102 (H) 70 - 99 mg/dL    BUN 13 6 - 20 mg/dL    Creatinine 0.74 0.51 - 0.95 mg/dL    Glomerular Filtration Rate >90 >=60    BUN/Cr 18 7 - 25    Calcium 8.5 8.4 - 10.2 mg/dL    Bilirubin, Total 0.2 0.2 - 1.0 mg/dL    GOT/AST 11 <=37 Units/L    GPT/ALT 16 <64 Units/L    Alkaline Phosphatase 64 45 - 117 Units/L    Albumin 3.7 3.6 - 5.1 g/dL    Protein, Total 7.5 6.4 - 8.2 g/dL    Globulin 3.8 2.0 - 4.0 g/dL    A/G Ratio 1.0 1.0 - 2.4   Urinalysis & Reflex Microscopy With Culture If Indicated   Result Value Ref Range    COLOR, URINALYSIS Straw     APPEARANCE, URINALYSIS Clear     GLUCOSE, URINALYSIS Negative Negative mg/dL    BILIRUBIN, URINALYSIS Negative Negative    KETONES, URINALYSIS Negative Negative mg/dL    SPECIFIC GRAVITY, URINALYSIS 1.018 1.005 - 1.030    OCCULT BLOOD, URINALYSIS Negative Negative    PH, URINALYSIS 5.0 5.0 - 7.0    PROTEIN, URINALYSIS Negative Negative mg/dL    UROBILINOGEN, URINALYSIS 0.2 0.2, 1.0 mg/dL    NITRITE, URINALYSIS Negative Negative    LEUKOCYTE ESTERASE, URINALYSIS Negative Negative   Beta HCG Quantitative Pregnancy   Result Value Ref Range    HCG, Quantitative <2 <=7 mUnits/mL    CBC with Automated Differential (performable only)   Result Value Ref Range    WBC 6.4 4.2 - 11.0 K/mcL    RBC 4.53 4.00 - 5.20 mil/mcL    HGB 13.8 12.0 - 15.5 g/dL    HCT 42.7 36.0 - 46.5 %    MCV 94.3 78.0 - 100.0 fl    MCH 30.5 26.0 - 34.0 pg    MCHC 32.3 32.0 - 36.5 g/dL    RDW-CV 13.2 11.0 - 15.0 %    RDW-SD 45.3 39.0 - 50.0 fL     140 - 450 K/mcL    NRBC 0 <=0 /100 WBC    Neutrophil, Percent 68 %    Lymphocytes, Percent 27 %    Mono, Percent 4 %    Eosinophils, Percent 1 %    Basophils, Percent 0 %    Immature Granulocytes 0 %    Absolute Neutrophils 4.3 1.8 - 7.7 K/mcL    Absolute Lymphocytes 1.7 1.0 - 4.8 K/mcL    Absolute Monocytes 0.2 (L) 0.3 - 0.9 K/mcL    Absolute Eosinophils  0.0 0.0 - 0.5 K/mcL    Absolute Basophils 0.0 0.0 - 0.3 K/mcL    Absolute Immature Granulocytes 0.0 0.0 - 0.2 K/mcL   Light Green Top   Result Value Ref Range    Extra Tube Hold for Add Ons    Lavender Top   Result Value Ref Range    Extra Tube Hold for Add Ons      Imaging:   CT ABDOMEN PELVIS W CONTRAST   Final Result   No acute intra-abdominal process.   Horseshoe kidneys.      Electronically Signed by: ASHLEY CORADO M.D.    Signed on: 7/11/2023 4:14 PM    Workstation ID: [id number]             [x] I personally reviewed the imaging study noted, my pertinent gross findings noted, pending final radiologist complete interpretation:         [x] Reviewed prior records with findings of:    ED Course as of 07/12/23 0626   Wed Jul 12, 2023   0556 Gynecology team consulted, to eval patient. [SG]   0624 Contacted by gynecology team attending Dr. Azar who clears the patient for discharge from gynecologic perspective. [SG]      ED Course User Index  [SG] Jose Ramon Padilla DO     Vitals:    07/11/23 0843 07/11/23 1332 07/11/23 1554 07/11/23 1928   BP: 134/88 106/72 121/77 110/73   Pulse: 75 (!) 59 79 68   Resp: 16 16 18 17   Temp: 97.3 °F (36.3 °C)   99 °F (37.2 °C)   TempSrc:    Oral   SpO2: 99% 100% 100% 100%    07/12/23  0026 07/12/23 0338 07/12/23 0358 07/12/23 0558   BP: 114/74 106/70  109/55   Pulse: 80 60  83   Resp: 16 16  16   Temp: 98.2 °F (36.8 °C)      TempSrc:       SpO2: 100% 100% 100% 100%     ED Medication Orders (From admission, onward)    None        MDM  Procedures      Pt was reassessed and reevaluated.       Discussed signs and symptoms of worsening condition that should prompt emergent reevaluation including but not limited to those of abdominal pain precautions, neurologic or vascular compromise, hemorrhage, infection, systemic symptoms, worsening symptoms or general worsening.     Patient was counseled regarding likely diagnoses, expected course and treatment plan of care going forward. Discussed need for continued rapid follow-up to ensure improvement, need for any continued work-up, or evaluate for other potential diagnoses. Discussed indications for emergent return to the emergency department. Patient acknowledged, verbalized, was agreeable with plan and will comply.      DIAGNOSIS  ED Diagnosis   1. Abdominal pain, unspecified abdominal location              Teaching-Supervisory Addendum-Brief    I participated in the following activities of this patients care: medical history, physical exam, medical decision making, any procedure(s).    I personally performed: supervision of the patient's care, the medical history, the physical exam, the medical decision making.    The case was discussed with: the resident.    Procedures: I directly supervised the procedure(s) noted.       Note to Patient: The 21st Century Cures Act makes medical notes like these available to patients in the interest of transparency. However, be advised this is a medical document. It is intended as peer to peer communication. It is written in medical language and may contain abbreviations or verbiage that are unfamiliar. It may appear blunt or direct. Medical documents are intended to carry relevant information, facts as evident, and the  clinical opinion of the practitioner.  This note may have been transcribed using a voice dictation system. Voice recognition errors may occur. This should not be taken to alter the content or meaning of this note     Herbie Prajapati MD  07/16/23 0991

## 2023-11-24 NOTE — ED ADULT TRIAGE NOTE - PAIN RATING/NUMBER SCALE (0-10): ACTIVITY
Vss, denies pain, ambulates easily. IV fluids discontinued. Report given to floor nurse,  Brian TAPIA. Patient brought back to room per wheelchair.     8

## 2025-01-03 NOTE — ED PROVIDER NOTE - LOCATION
IV discontinued. DSD applied. Pt tolerated well. Discharged instructions given to pt, pt voiced understanding.  Pt discharged via ambulatory by self to exit.    chest

## 2025-05-15 ENCOUNTER — EMERGENCY (EMERGENCY)
Facility: HOSPITAL | Age: 60
LOS: 1 days | End: 2025-05-15
Attending: STUDENT IN AN ORGANIZED HEALTH CARE EDUCATION/TRAINING PROGRAM
Payer: MEDICAID

## 2025-05-15 VITALS
OXYGEN SATURATION: 100 % | TEMPERATURE: 97 F | DIASTOLIC BLOOD PRESSURE: 72 MMHG | HEART RATE: 67 BPM | RESPIRATION RATE: 18 BRPM | SYSTOLIC BLOOD PRESSURE: 115 MMHG

## 2025-05-15 VITALS
SYSTOLIC BLOOD PRESSURE: 101 MMHG | OXYGEN SATURATION: 96 % | HEART RATE: 78 BPM | TEMPERATURE: 98 F | HEIGHT: 72 IN | RESPIRATION RATE: 18 BRPM | WEIGHT: 150.8 LBS | DIASTOLIC BLOOD PRESSURE: 62 MMHG

## 2025-05-15 DIAGNOSIS — Z90.710 ACQUIRED ABSENCE OF BOTH CERVIX AND UTERUS: Chronic | ICD-10-CM

## 2025-05-15 LAB
ALBUMIN SERPL ELPH-MCNC: 3.8 G/DL — SIGNIFICANT CHANGE UP (ref 3.5–5)
ALP SERPL-CCNC: 84 U/L — SIGNIFICANT CHANGE UP (ref 40–120)
ALT FLD-CCNC: 25 U/L DA — SIGNIFICANT CHANGE UP (ref 10–60)
ANION GAP SERPL CALC-SCNC: 6 MMOL/L — SIGNIFICANT CHANGE UP (ref 5–17)
APPEARANCE UR: CLEAR — SIGNIFICANT CHANGE UP
AST SERPL-CCNC: 18 U/L — SIGNIFICANT CHANGE UP (ref 10–40)
BACTERIA # UR AUTO: NEGATIVE /HPF — SIGNIFICANT CHANGE UP
BASOPHILS # BLD AUTO: 0.03 K/UL — SIGNIFICANT CHANGE UP (ref 0–0.2)
BASOPHILS NFR BLD AUTO: 0.4 % — SIGNIFICANT CHANGE UP (ref 0–2)
BILIRUB SERPL-MCNC: 1.2 MG/DL — SIGNIFICANT CHANGE UP (ref 0.2–1.2)
BILIRUB UR-MCNC: ABNORMAL
BUN SERPL-MCNC: 14 MG/DL — SIGNIFICANT CHANGE UP (ref 7–18)
CALCIUM SERPL-MCNC: 9 MG/DL — SIGNIFICANT CHANGE UP (ref 8.4–10.5)
CHLORIDE SERPL-SCNC: 105 MMOL/L — SIGNIFICANT CHANGE UP (ref 96–108)
CO2 SERPL-SCNC: 25 MMOL/L — SIGNIFICANT CHANGE UP (ref 22–31)
COLOR SPEC: SIGNIFICANT CHANGE UP
CREAT SERPL-MCNC: 0.92 MG/DL — SIGNIFICANT CHANGE UP (ref 0.5–1.3)
DIFF PNL FLD: NEGATIVE — SIGNIFICANT CHANGE UP
EGFR: 72 ML/MIN/1.73M2 — SIGNIFICANT CHANGE UP
EGFR: 72 ML/MIN/1.73M2 — SIGNIFICANT CHANGE UP
EOSINOPHIL # BLD AUTO: 0.03 K/UL — SIGNIFICANT CHANGE UP (ref 0–0.5)
EOSINOPHIL NFR BLD AUTO: 0.4 % — SIGNIFICANT CHANGE UP (ref 0–6)
EPI CELLS # UR: SIGNIFICANT CHANGE UP
FLUAV AG NPH QL: SIGNIFICANT CHANGE UP
FLUBV AG NPH QL: SIGNIFICANT CHANGE UP
GLUCOSE SERPL-MCNC: 86 MG/DL — SIGNIFICANT CHANGE UP (ref 70–99)
GLUCOSE UR QL: NEGATIVE MG/DL — SIGNIFICANT CHANGE UP
HCT VFR BLD CALC: 37.2 % — SIGNIFICANT CHANGE UP (ref 34.5–45)
HGB BLD-MCNC: 12.2 G/DL — SIGNIFICANT CHANGE UP (ref 11.5–15.5)
IMM GRANULOCYTES NFR BLD AUTO: 0.3 % — SIGNIFICANT CHANGE UP (ref 0–0.9)
KETONES UR QL: ABNORMAL MG/DL
LEUKOCYTE ESTERASE UR-ACNC: ABNORMAL
LIDOCAIN IGE QN: 50 U/L — SIGNIFICANT CHANGE UP (ref 13–75)
LYMPHOCYTES # BLD AUTO: 2.8 K/UL — SIGNIFICANT CHANGE UP (ref 1–3.3)
LYMPHOCYTES # BLD AUTO: 35.2 % — SIGNIFICANT CHANGE UP (ref 13–44)
MAGNESIUM SERPL-MCNC: 2 MG/DL — SIGNIFICANT CHANGE UP (ref 1.6–2.6)
MCHC RBC-ENTMCNC: 29.5 PG — SIGNIFICANT CHANGE UP (ref 27–34)
MCHC RBC-ENTMCNC: 32.8 G/DL — SIGNIFICANT CHANGE UP (ref 32–36)
MCV RBC AUTO: 90.1 FL — SIGNIFICANT CHANGE UP (ref 80–100)
MONOCYTES # BLD AUTO: 0.44 K/UL — SIGNIFICANT CHANGE UP (ref 0–0.9)
MONOCYTES NFR BLD AUTO: 5.5 % — SIGNIFICANT CHANGE UP (ref 2–14)
NEUTROPHILS # BLD AUTO: 4.64 K/UL — SIGNIFICANT CHANGE UP (ref 1.8–7.4)
NEUTROPHILS NFR BLD AUTO: 58.2 % — SIGNIFICANT CHANGE UP (ref 43–77)
NITRITE UR-MCNC: NEGATIVE — SIGNIFICANT CHANGE UP
NRBC BLD AUTO-RTO: 0 /100 WBCS — SIGNIFICANT CHANGE UP (ref 0–0)
PH UR: 5.5 — SIGNIFICANT CHANGE UP (ref 5–8)
PLATELET # BLD AUTO: 249 K/UL — SIGNIFICANT CHANGE UP (ref 150–400)
POTASSIUM SERPL-MCNC: 3.6 MMOL/L — SIGNIFICANT CHANGE UP (ref 3.5–5.3)
POTASSIUM SERPL-SCNC: 3.6 MMOL/L — SIGNIFICANT CHANGE UP (ref 3.5–5.3)
PROT SERPL-MCNC: 6.9 G/DL — SIGNIFICANT CHANGE UP (ref 6–8.3)
PROT UR-MCNC: ABNORMAL MG/DL
RBC # BLD: 4.13 M/UL — SIGNIFICANT CHANGE UP (ref 3.8–5.2)
RBC # FLD: 14.3 % — SIGNIFICANT CHANGE UP (ref 10.3–14.5)
RBC CASTS # UR COMP ASSIST: 0 /HPF — SIGNIFICANT CHANGE UP (ref 0–4)
RSV RNA NPH QL NAA+NON-PROBE: SIGNIFICANT CHANGE UP
SARS-COV-2 RNA SPEC QL NAA+PROBE: SIGNIFICANT CHANGE UP
SODIUM SERPL-SCNC: 136 MMOL/L — SIGNIFICANT CHANGE UP (ref 135–145)
SOURCE RESPIRATORY: SIGNIFICANT CHANGE UP
SP GR SPEC: 1.02 — SIGNIFICANT CHANGE UP (ref 1–1.03)
UROBILINOGEN FLD QL: 1 MG/DL — SIGNIFICANT CHANGE UP (ref 0.2–1)
WBC # BLD: 7.96 K/UL — SIGNIFICANT CHANGE UP (ref 3.8–10.5)
WBC # FLD AUTO: 7.96 K/UL — SIGNIFICANT CHANGE UP (ref 3.8–10.5)
WBC UR QL: 1 /HPF — SIGNIFICANT CHANGE UP (ref 0–5)

## 2025-05-15 PROCEDURE — 99284 EMERGENCY DEPT VISIT MOD MDM: CPT

## 2025-05-15 PROCEDURE — 81001 URINALYSIS AUTO W/SCOPE: CPT

## 2025-05-15 PROCEDURE — 85025 COMPLETE CBC W/AUTO DIFF WBC: CPT

## 2025-05-15 PROCEDURE — 96374 THER/PROPH/DIAG INJ IV PUSH: CPT

## 2025-05-15 PROCEDURE — 87637 SARSCOV2&INF A&B&RSV AMP PRB: CPT

## 2025-05-15 PROCEDURE — 83735 ASSAY OF MAGNESIUM: CPT

## 2025-05-15 PROCEDURE — 99284 EMERGENCY DEPT VISIT MOD MDM: CPT | Mod: 25

## 2025-05-15 PROCEDURE — 80053 COMPREHEN METABOLIC PANEL: CPT

## 2025-05-15 PROCEDURE — 83690 ASSAY OF LIPASE: CPT

## 2025-05-15 PROCEDURE — 36415 COLL VENOUS BLD VENIPUNCTURE: CPT

## 2025-05-15 RX ORDER — ONDANSETRON HCL/PF 4 MG/2 ML
4 VIAL (ML) INJECTION ONCE
Refills: 0 | Status: COMPLETED | OUTPATIENT
Start: 2025-05-15 | End: 2025-05-15

## 2025-05-15 RX ORDER — SODIUM CHLORIDE 9 G/1000ML
1000 INJECTION, SOLUTION INTRAVENOUS ONCE
Refills: 0 | Status: COMPLETED | OUTPATIENT
Start: 2025-05-15 | End: 2025-05-15

## 2025-05-15 RX ORDER — METHOCARBAMOL 500 MG/1
2 TABLET, FILM COATED ORAL
Qty: 24 | Refills: 0
Start: 2025-05-15

## 2025-05-15 RX ORDER — ONDANSETRON HCL/PF 4 MG/2 ML
1 VIAL (ML) INJECTION
Qty: 6 | Refills: 0
Start: 2025-05-15

## 2025-05-15 RX ADMIN — SODIUM CHLORIDE 1000 MILLILITER(S): 9 INJECTION, SOLUTION INTRAVENOUS at 08:36

## 2025-05-15 RX ADMIN — Medication 4 MILLIGRAM(S): at 08:36
